# Patient Record
Sex: FEMALE | Race: WHITE | Employment: FULL TIME | URBAN - METROPOLITAN AREA
[De-identification: names, ages, dates, MRNs, and addresses within clinical notes are randomized per-mention and may not be internally consistent; named-entity substitution may affect disease eponyms.]

---

## 2020-06-18 ENCOUNTER — HOSPITAL ENCOUNTER (EMERGENCY)
Facility: CLINIC | Age: 20
Discharge: HOME | End: 2020-06-19
Attending: EMERGENCY MEDICINE | Admitting: EMERGENCY MEDICINE

## 2020-06-18 DIAGNOSIS — R00.2 PALPITATIONS: ICD-10-CM

## 2020-06-18 DIAGNOSIS — R07.9 ACUTE CHEST PAIN: ICD-10-CM

## 2020-06-18 DIAGNOSIS — G90.A POTS (POSTURAL ORTHOSTATIC TACHYCARDIA SYNDROME): ICD-10-CM

## 2020-06-18 PROCEDURE — 99283 EMERGENCY DEPT VISIT LOW MDM: CPT

## 2020-06-18 PROCEDURE — 93005 ELECTROCARDIOGRAM TRACING: CPT

## 2020-06-18 ASSESSMENT — ENCOUNTER SYMPTOMS
VOMITING: 0
DIARRHEA: 0
NAUSEA: 0
FEVER: 0
PALPITATIONS: 1
SHORTNESS OF BREATH: 0
COUGH: 0

## 2020-06-18 NOTE — ED AVS SNAPSHOT
Emergency Department  [address] [facility] [address]  Phone:  [phone]  Fax:  [phone]                                    Silvana Camarena   MRN: [id number]    Department:   Emergency Department   Date of Visit:  6/18/2020           After Visit Summary Signature Page    I have received my discharge instructions, and my questions have been answered. I have discussed any challenges I see with this plan with the nurse or doctor.    ..........................................................................................................................................  Patient/Patient Representative Signature      ..........................................................................................................................................  Patient Representative Print Name and Relationship to Patient    ..................................................               ................................................  Date                                   Time    ..........................................................................................................................................  Reviewed by Signature/Title    ...................................................              ..............................................  Date                                               Time          22EPIC Rev 08/18

## 2020-06-19 VITALS
SYSTOLIC BLOOD PRESSURE: 119 MMHG | HEART RATE: 86 BPM | RESPIRATION RATE: 25 BRPM | DIASTOLIC BLOOD PRESSURE: 76 MMHG | OXYGEN SATURATION: 96 %

## 2020-06-19 LAB — INTERPRETATION ECG - MUSE: NORMAL

## 2020-06-19 NOTE — ED NOTES
"Responded to pt's call light. Pt stated she called the business center to ask about billing options and discovered they were closed. Pt stated \"well I guess I will go then.\" Pt was informed that the hospital does not expect payment while she is here, pt responded by saying \"I don't have insurance and don't know how much any of this is going to cost. I can't pay for this\". Pt was asked to wait while RN and MD are notified. Which the pt did.   "

## 2020-06-19 NOTE — ED PROVIDER NOTES
"  History     Chief Complaint:  Palpitations      HPI   Silvana Camarena is a 19 year old female who presents with palpitations. The patient states that a few years back she had been diagnosed with Pots syndrome which she notes that she has been seeing a homeopathic doctor for care and has been watching her exercise and caffeine intake.  The patient notes that a few hours prior to arrival she had noticed that she had been feeling different and noticed her heart rate increased and checked her rate on her watch.The patient notes that she has upper chest pain that had felt burning in sensation in her chest. She notes that if she laughs her\" heart hurts\". She notes some lightheadedness like the room is spinning which she normally feels on a daily basis but has noted some recent diaphoretic. She denies fever, cough, nausea, emesis, shortness of breath, leg pain or leg swelling, loss of taste or smell, or fatigue. She also notes that she has not been in the sun today.     She states that around a month ago a roommate friend had been COVID positive and had then quarantined since. She also notes that her six roommates have not been quarantining.     Cardiac/PE/DVT Risk Factors:  History of hypertension - No  History of hyperlipidemia - no  History of diabetes - No  History of smoking - Yes  Personal history of PE/DVT - No  Family history of PE/DVT - No  Family history of heart complications - No  Recent travel - no  Recent surgery - no  Other immobilizations - No  Cancer - No  Family members born Deaf- No     Allergies:  No known drug allergies     Medications:              Past Medical History:    The patient is not currently taking any prescribed medications.     Past Surgical History:    The patient does not have any pertinent past surgical history.     Family History:    No past pertinent family history.     Social History:  Smoking status: yes, rarely and vapes with nicotine which she notes that she had today this " afternoon, just before her shaking started.   Alcohol use: Yes, occasionally weekly; denies today  Drug use: No  PCP: Provider Not In System  Presents to the ED unaccompanied  Up to date on immunization  Marital Status:  Single [1]    Review of Systems   Constitutional: Negative for fever.   Respiratory: Negative for cough and shortness of breath.    Cardiovascular: Positive for palpitations. Negative for leg swelling.   Gastrointestinal: Negative for diarrhea, nausea and vomiting.   Musculoskeletal:        Chest wall pain    All other systems reviewed and are negative.    Physical Exam     Patient Vitals for the past 24 hrs:   BP Pulse Heart Rate Resp SpO2   06/19/20 0100 119/76 86 81 25 96 %   06/19/20 0045 -- -- 72 19 97 %   06/19/20 0030 115/72 73 79 15 97 %   06/19/20 0015 126/75 -- 85 16 97 %   06/18/20 2349 137/85 -- -- -- --   06/18/20 2347 -- -- 96 18 99 %        Physical Exam  General: Well-nourished, appears to be resting comfortably when I enter the room  Eyes: PERRL, conjunctivae pink no scleral icterus or conjunctival injection  ENT:  Moist mucus membranes, posterior oropharynx clear without erythema or exudates  Respiratory:  Lungs clear to auscultation bilaterally, no crackles/rubs/wheezes.  Good air movement  CV: Normal rate and rhythm, no murmurs/rubs/gallops. Mild left chest wall tenderness.  No abscess or breast lesion apparent  GI:  Abdomen soft and non-distended.  Normoactive BS.  No tenderness, guarding or rebound  Skin: Warm, dry.  No rashes or petechiae.  No zoster rash.  Musculoskeletal: No peripheral edema or calf tenderness  Neuro: Alert and oriented to person/place/time  Psychiatric: Normal affect    Emergency Department Course     ECG:  ECG taken at 0048, ECG read at 0052  Normal sinus rhythm  Left axis deviation  abnormal ECG  Rate 67 bpm. DC interval 136 ms. QRS duration 88 ms. QT/QTc 396/418 ms. P-R-T axes 28 -38 35.    Emergency Department Course:   Nursing notes and vitals  reviewed.    2345 I performed an exam of the patient as documented above.     0048 EKG was performed, see results above.     0050 I personally reviewed the results with the patient and answered all related questions prior to discharge.    Impression & Plan      Medical Decision Making:  Silvana Camarena is a 19 year old female with a history of pots syndrome who presents to the emergency department today for evaluation of palpitations as well as chest pain.  She has tenderness of the chest wall, increasing the likelihood that this is musculoskeletal in nature.  She has a normal heart rate here.  She vapes but does not smoke tobacco.  Her EKG is nonischemic and otherwise reassuring.  There is no signs of shingles.  She immediately expressed concern about cost as she has no insurance.  I reassured her that we would take care of her regardless.  I recommended EKG, lab work as well as d-dimer with either chest x-ray or CT scan to rule out pulmonary embolism based on her symptoms.  She eventually agreed to an EKG but did not wish to undergo any further testing or imaging.  I explained her that I cannot rule out a pulmonary embolism, anemia, electrolyte abnormality or another life-threatening cause of her symptoms, however I do believe the cause is likely benign.  It is likely that her palpitations are more related to her POTS syndrome and the pain is chest wall pain is noted.  She does take homeopathic medications as well.  Photos are as noted above.  She is using them as directed by her homeopathic clinician and I do not suspect an toxic overdose.  She does not have any specific symptoms of COVID19 and denies any recent exposures.  At this point we will defer testing but she is asked to report for testing should she develop a fever, cough, shortness of breath, changes in smell or taste or become worse in any way.  She was welcomed to return should she change her mind and encouraged to return should she become worse in  any way.  She is asked to follow-up with her primary care doctor soon as possible.    Diagnosis:    ICD-10-CM    1. Acute chest pain  R07.9    2. Palpitations  R00.2    3. POTS (postural orthostatic tachycardia syndrome)  I49.8        Disposition:   The patient is discharged to home.     Scribe Disclosure:  Verna GONZALEZ, am serving as a scribe at 2345 on 6/18/2020 to document services personally performed by Tiarra Jacobo MD based on my observations and the provider's statements to me.    EMERGENCY DEPARTMENT       Tiarra Jacobo MD  06/19/20 9186

## 2020-06-19 NOTE — DISCHARGE INSTRUCTIONS
*You may resume diet and activities as tolerated.  *Ibuprofen and/or Tylenol as directed as needed for pain.  *Follow-up with your doctor for a recheck in 2-3 days.    *Return if you change your mind about further testing, develop fever, develop difficulty in breathing, faint or feel like you will faint or become worse in any way.    Discharge Instructions  Chest Pain    You have been seen today for chest pain or discomfort.  At this time, your doctor has found no signs that your chest pain is due to a serious or life-threatening condition, (or you have declined more testing and/or admission to the hospital). However, sometimes there is a serious problem that does not show up right away. Your evaluation today may not be complete and you may need further testing and evaluation.     You need to follow-up with your regular doctor within 3 days.    Return to the Emergency Department if:  Your chest pain changes, gets worse, starts to happen more often, or comes with less activity.  You are short of breath.  You get very weak or tired.  You pass out or faint.  You have any new symptoms, like fever, cough, numb legs, or you cough up blood.  You have anything else that worries you.    Until you follow-up with your regular doctor please do the following:  Take one aspirin daily unless you have an allergy or are told not to by your doctor.  If a stress test appointment has been made, go to the appointment.  If you have questions, contact your regular doctor.    If your doctor today has told you to follow-up with your regular doctor, it is very important that you make an appointment with your clinic and go to the appointment.  If you do not follow-up with your primary doctor, it may result in missing an important development which could result in permanent injury or disability and/or lasting pain.  If there is any problem keeping your appointment, call your doctor or return to the Emergency Department.    If you were given a  prescription for medicine here today, be sure to read all of the information (including the package insert) that comes with your prescription.  This will include important information about the medicine, its side effects, and any warnings that you need to know about.  The pharmacist who fills the prescription can provide more information and answer questions you may have about the medicine.  If you have questions or concerns that the pharmacist cannot address, please call or return to the Emergency Department.     Opioid Medication Information    Pain medications are among the most commonly prescribed medicines, so we are including this information for all our patients. If you did not receive pain medication or get a prescription for pain medicine, you can ignore it.     You may have been given a prescription for an opioid (narcotic) pain medicine and/or have received a pain medicine while here in the Emergency Department. These medicines can make you drowsy or impaired. You must not drive, operate dangerous equipment, or engage in any other dangerous activities while taking these medications. If you drive while taking these medications, you could be arrested for DUI, or driving under the influence. Do not drink any alcohol while you are taking these medications.     Opioid pain medications can cause addiction. If you have a history of chemical dependency of any type, you are at a higher risk of becoming addicted to pain medications.  Only take these prescribed medications to treat your pain when all other options have been tried. Take it for as short a time and as few doses as possible. Store your pain pills in a secure place, as they are frequently stolen and provide a dangerous opportunity for children or visitors in your house to start abusing these powerful medications. We will not replace any lost or stolen medicine.  As soon as your pain is better, you should flush all your remaining medication.     Many  prescription pain medications contain Tylenol  (acetaminophen), including Vicodin , Tylenol #3 , Norco , Lortab , and Percocet .  You should not take any extra pills of Tylenol  if you are using these prescription medications or you can get very sick.  Do not ever take more than 3000 mg of acetaminophen in any 24 hour period.    All opioids tend to cause constipation. Drink plenty of water and eat foods that have a lot of fiber, such as fruits, vegetables, prune juice, apple juice and high fiber cereal.  Take a laxative if you don t move your bowels at least every other day. Miralax , Milk of Magnesia, Colace , or Senna  can be used to keep you regular.      Remember that you can always come back to the Emergency Department if you are not able to see your regular doctor in the amount of time listed above, if you get any new symptoms, or if there is anything that worries you.

## 2020-06-19 NOTE — ED NOTES
Patient had asked about price of services once in the room. Writer provided the contact number for registration. After patient spoke to registration, patient determined that she would like to leave. Writer discuss some of the risks of leaving AMA with patient, including blood clots, electrolyte imbalances, and ultimately death. Therapeutic communication was provided along with teaching. MD was notified of patient's decision.

## 2024-04-21 ENCOUNTER — HOSPITAL ENCOUNTER (EMERGENCY)
Facility: CLINIC | Age: 24
End: 2024-04-21